# Patient Record
Sex: MALE | Race: WHITE
[De-identification: names, ages, dates, MRNs, and addresses within clinical notes are randomized per-mention and may not be internally consistent; named-entity substitution may affect disease eponyms.]

---

## 2018-12-31 ENCOUNTER — HOSPITAL ENCOUNTER (INPATIENT)
Dept: HOSPITAL 53 - M ED | Age: 43
LOS: 4 days | Discharge: HOME | DRG: 751 | End: 2019-01-04
Attending: PSYCHIATRY & NEUROLOGY | Admitting: PSYCHIATRY & NEUROLOGY
Payer: COMMERCIAL

## 2018-12-31 VITALS — HEIGHT: 69 IN | WEIGHT: 181.44 LBS | BODY MASS INDEX: 26.87 KG/M2

## 2018-12-31 VITALS — DIASTOLIC BLOOD PRESSURE: 98 MMHG | SYSTOLIC BLOOD PRESSURE: 12 MMHG

## 2018-12-31 DIAGNOSIS — R45.851: ICD-10-CM

## 2018-12-31 DIAGNOSIS — F33.2: Primary | ICD-10-CM

## 2018-12-31 LAB
ALBUMIN SERPL BCG-MCNC: 4.2 GM/DL (ref 3.2–5.2)
ALT SERPL W P-5'-P-CCNC: 35 U/L (ref 12–78)
AMPHETAMINES UR QL SCN: NEGATIVE
APAP SERPL-MCNC: < 2 UG/ML (ref 10–30)
BARBITURATES UR QL SCN: NEGATIVE
BENZODIAZ UR QL SCN: NEGATIVE
BILIRUB CONJ SERPL-MCNC: 0.1 MG/DL (ref 0–0.2)
BILIRUB SERPL-MCNC: 0.4 MG/DL (ref 0.2–1)
BUN SERPL-MCNC: 10 MG/DL (ref 7–18)
BZE UR QL SCN: NEGATIVE
CALCIUM SERPL-MCNC: 8.8 MG/DL (ref 8.5–10.1)
CANNABINOIDS UR QL SCN: NEGATIVE
CHLORIDE SERPL-SCNC: 106 MEQ/L (ref 98–107)
CO2 SERPL-SCNC: 27 MEQ/L (ref 21–32)
CREAT SERPL-MCNC: 0.92 MG/DL (ref 0.7–1.3)
ETHANOL SERPL-MCNC: < 0.003 % (ref 0–0.01)
GFR SERPL CREATININE-BSD FRML MDRD: > 60 ML/MIN/{1.73_M2} (ref 60–?)
GLUCOSE SERPL-MCNC: 104 MG/DL (ref 70–100)
HCT VFR BLD AUTO: 45.2 % (ref 42–52)
HGB BLD-MCNC: 16.1 G/DL (ref 13.5–17.5)
MCH RBC QN AUTO: 30.4 PG (ref 27–33)
MCHC RBC AUTO-ENTMCNC: 35.6 G/DL (ref 32–36.5)
MCV RBC AUTO: 85.3 FL (ref 80–96)
METHADONE UR QL SCN: NEGATIVE
OPIATES UR QL SCN: NEGATIVE
PCP UR QL SCN: NEGATIVE
PLATELET # BLD AUTO: 231 10^3/UL (ref 150–450)
POTASSIUM SERPL-SCNC: 4 MEQ/L (ref 3.5–5.1)
PROT SERPL-MCNC: 7.7 GM/DL (ref 6.4–8.2)
RBC # BLD AUTO: 5.3 10^6/UL (ref 4.3–6.1)
SALICYLATES SERPL-MCNC: < 1.7 MG/DL (ref 5–30)
SODIUM SERPL-SCNC: 141 MEQ/L (ref 136–145)
TSH SERPL DL<=0.005 MIU/L-ACNC: 1.65 UIU/ML (ref 0.36–3.74)
WBC # BLD AUTO: 6.4 10^3/UL (ref 4–10)

## 2019-01-01 VITALS — SYSTOLIC BLOOD PRESSURE: 126 MMHG | DIASTOLIC BLOOD PRESSURE: 91 MMHG

## 2019-01-01 VITALS — DIASTOLIC BLOOD PRESSURE: 78 MMHG | SYSTOLIC BLOOD PRESSURE: 119 MMHG

## 2019-01-01 NOTE — MHHPE
DATE OF ADMISSION:  12/31/2018

 

IDENTIFYING DATA: He is a 43-year-old   male, single, living by

himself in his apartment, working in a gas station. He is self-referred to

[facility] (ER) for severe depression and suicidal thoughts.

 

CHIEF COMPLAINT: "I was severely depressed, and I was having a lot of stress."

 

HISTORY OF PRESENT ILLNESS: This is a first psychiatric hospitalization. Patient

reported moved from [place] to [place], from where he originally is from,

with his daughter, who is 14 years old. He was stressed out because he had to

take care of his daughter, who has spina bifida and mental retardation. He was

working in a gas station. He was cited with medical neglect by child protective

services (CPS) for daughter having bed sores. He voluntarily gave the custody to

his late wife' parents in [place]. For the last 6 months he has been depressed, but

for the last 2 weeks it has worsened, and he had suicidal thoughts. He organized

a plan after researching and bought ibuprofen bottle and wanted to consume all of

it and then later on end with sleeping pills; however, 3 days ago he threw the

bottle and went for help to [facility], where he was

advised to go to the hospital for help. He has been complaining of poor appetite,

poor sleep, poor self-esteem, low energy, and suicidal thoughts. Denies any

history of manic episodes or psychosis.

 

PAST PSYCHIATRIC HISTORY: Never been on any psychotropic medication. Never seen

any psychiatrist before. Never been hospitalized.

 

DRUG/ALCOHOL HISTORY: Patient denies drug or alcohol use.

 

SUICIDAL HISTORY: Denies suicidal attempts.

 

MEDICAL HISTORY: Patient denies any medical problems.

 

FAMILY HISTORY: Mother has history of depression. Father was alcoholic, and he

committed suicide.

 

PERSONAL HISTORY: He was born and raised in [place]. He got . Moved to

[place]. Lived with his wife for 10 years. For the last 6 years he has been

. He has a 14-year-old daughter. He has college degree in [employer].

He has worked from age 17. No history of physical or sexual abuse.

 

MENTAL STATUS EXAMINATION: He is casually dressed with clean clothes,

cooperative. Made good eye contact. Psychomotor activity is normal. Speech rate,

rhythm, volume are good, articulate. Mood is depressed. Affect is blunted.

Thought process linear, goal directed, coherent. Thought content: Denied any

delusions, however, continues to have some vague suicidal ideas without any

plans. His insight and judgment are fair to poor. He is alert and oriented to

time, place, and person. Memory is intact.

 

VITAL SIGNS: Temperature 98.4, pulse 75, respiratory rate is 14, blood pressure

is 119/78.

 

REVIEW OF SYSTEMS:

CONSTITUTIONAL: Denied any night sweats, fever, weight loss.

HEENT: Denied any sore throats, hearing loss, or epistaxis.

RESPIRATORY: Denied cough or chest pain.

CARDIOVASCULAR: Denied palpitations, chest pain.

ABDOMEN: Denied abdominal pain, dysuria.

GENITOURINARY:  Denied dysuria, hematuria.

CENTRAL NERVOUS SYSTEM: Denied numbness, tingling, and dizziness. Gait is normal.

 

 

LABORATORY DATA:

CBC, CMP within normal limits. Toxicology was negative.

 

DIAGNOSES:

Major depressive disorder, severe, without psychosis.

 

ASSESSMENT AND PLAN: He is a 43-year-old male who has been severely depressed

with a lot of stress. Recently he had to give the custody of his daughter to his

late wife's parents. He had an organized plan to kill himself, so he is in high

risk of suicide.

 

1. Admit to [facility].

2. Keep on 15-minute checks and suicide watch.

3. Continue individual, group, and milieu therapy. Encourage him to attend

activities.

4. He will be seen by PA for medical needs.

5. He will be seen by case management social work.

 

MEDICATIONS:

- Prozac 10 mg once daily. Titrate the dose.

- Patient is on trazodone 50 mg at bedtime as needed for insomnia.

 

ESTIMATED LENGTH OF STAY: 5-6 days.

## 2019-01-02 VITALS — DIASTOLIC BLOOD PRESSURE: 82 MMHG | SYSTOLIC BLOOD PRESSURE: 132 MMHG

## 2019-01-02 VITALS — DIASTOLIC BLOOD PRESSURE: 81 MMHG | SYSTOLIC BLOOD PRESSURE: 139 MMHG

## 2019-01-02 NOTE — HPE
DATE OF ADMISSION:  12/31/2018

 

HISTORY OF PRESENT ILLNESS:   Please refer to psychiatric history and evaluation

for further details on this admission.  This examination and history is intended

for medical issues, which may need treatment, followup or consultation on this

43-year-old male.

 

ALLERGIES:  No known allergies.

 

PRIMARY CARE PROVIDER:  None.

 

SOCIAL HISTORY:   He is .  EtOH none. Smokes none.  Recreational drug use

none.

 

PAST MEDICAL HISTORY: Negative.

 

PAST SURGICAL HISTORY:  Left knee surgery.

 

HOME MEDICATIONS: None.

 

LABORATORY STUDIES:  Complete blood count (CBC) was normal.  Electrolytes were

normal.  BUN and creatinine were 10 and 0.92.  Urine toxicology is negative.

 

REVIEW OF SYSTEMS:  Ten systems review was done and unremarkable.

 

PHYSICAL EXAMINATION:

GENERAL:  43-year-old cooperative male in no acute distress.  Height 69 inches,

weight 82.3 kg, Body Mass Index (BMI) 26.8.

VITAL SIGNS:  127/84.  Pulse 88, respirations 18, temperature 98.6, oxygen

saturation 98% on room air.  The patient is alert and oriented times three.

HEENT:  Pupils are equal and reactive to light.  Extraocular muscles intact.

Sclerae clear.  Conjunctivae normal.  No facial asymmetry.  Pharynx, gums and

tongue pink and moist.  Tongue is midline.

NECK:  Supple without lymphadenopathy, thyromegaly or goiter. Carotids are 2+

without bruit.

CHEST:  Clear to auscultation without wheeze or retraction.

HEART:  Regular.

ABDOMEN:  Benign.  Bowel sounds positive.

GENITOURINARY/RECTAL:  Not done.

EXTREMITIES:  Equal strength, full range of motion.  No clubbing, cyanosis, and

edema.  Peripheral pulses equal and palpable bilaterally.

SKIN:  Warm and dry.

 

IMPRESSION/PLAN:

1.  Psychiatric plan per psychiatry.

2.  No acute medical issues.

## 2019-01-02 NOTE — MHIPNPDOC
[facility] Progress Note


Progress Note


DATE OF SERVICE: 1/2/19





HISTORY: This is a first psychiatric hospitalization. Patient


reported moved from [place] to [place], from where he originally is from,


with his daughter, who is 14 years old. He was stressed out because he had to


take care of his daughter, who has spina bifida and mental retardation. He was


working in a gas station. He was cited with medical neglect by child protective


services (CPS) for daughter having bed sores. He voluntarily gave the custody to


his late wife' parents in [place]. For the last 6 months he has been depressed, 

but


for the last 2 weeks it has worsened, and he had suicidal thoughts. He organized


a plan after researching and bought ibuprofen bottle and wanted to consume all 

of


it and then later on end with sleeping pills; however, 3 days ago he threw the


bottle and went for help to [facility], where he was


advised to go to the hospital for help. He has been complaining of poor 

appetite,


poor sleep, poor self-esteem, low energy, and suicidal thoughts. Denies any


history of manic episodes or psychosis.





VITAL SIGNS: See below.





NEW TEST RESULTS: See below.





CURRENT MEDICATIONS: See below.





MENTAL STATUS EXAMINATION:


He is casually dressed with clean clothes,


cooperative. Made good eye contact. Psychomotor activity is normal. Speech rate,


rhythm, volume are good, articulate. Mood is depressed. Affect is monothymic, 

improved range.


Thought process linear, goal directed, coherent. Cognitive distortions.  Thought

content: Denied any


delusions.  Denies SI/HI. His insight and judgment are fair to poor. He is alert

and oriented to


time, place, and person. Memory is intact.





DIAGNOSES:


1. Major depressive disorder, severe, without psychosis.


 


ASSESSMENT:Pt seen and states that his mood is better and he's finding prozac 

beneficial and tolerating well.  States his anxiety is improved too.  States he 

feels anxious at times as he thinks very critically of himself occasionally.  

Discussed him replacing negative thought with positive thought (CBT) and states 

he will try to.  States he slept well last night.  He is attending groups and 

finding them helpful.  He denies insomnia, SI/HI, hallucinations, delusions.  Pt

feels safe here





MANAGEMENT PLAN: Continue current plan.


Medications:


- Prozac 10 mg daily


- trazodone 50 mg at bedtime as needed for insomnia.





Time Spent: 30ming





Vital Signs





Vital Signs








  Date Time  Temp Pulse Resp B/P (MAP) Pulse Ox O2 Delivery O2 Flow Rate FiO2


 


1/2/19 07:00 97.8 74 16 132/82 (99)    


 


12/31/18 17:21      Room Air  


 


12/31/18 17:03     98   











Current Medications





Current Medications


Acetaminophen (Tylenol Tab) 650 mg Q6HP  PRN PO HEADACHE or DISCOMFORT;  Start 

12/31/18 at 14:00


Al Hydrox/Mg Hydrox/Simethicone (Mylanta) 30 ml Q4HP  PRN PO 

HEARTBURN/INDIGESTION;  Start 12/31/18 at 14:00


Fluoxetine HCl (PROzac) 10 mg DAILY PO  Last administered on 1/2/19at 08:26;  

Start 1/1/19 at 09:00


Home Med (Med Rec Complete!)  ASDIRECTED XX ;  Start 12/31/18 at 12:30;  Stop 

12/31/18 at 12:30;  Status DC


Magnesium Hydroxide (Milk Of Magnesia) 30 ml DAILYPRN  PRN PO CONSTIPATION;  

Start 12/31/18 at 14:00


Trazodone HCl (Desyrel) 50 mg QHSP  PRN PO INSOMNIA Last administered on 

1/1/19at 21:04;  Start 12/31/18 at 14:00





Allergies


Coded Allergies:  


     No Known Allergies (Unverified , 12/31/18)











REYES PIERCE DO          Jan 2, 2019 12:11 pm

## 2019-01-03 VITALS — DIASTOLIC BLOOD PRESSURE: 82 MMHG | SYSTOLIC BLOOD PRESSURE: 138 MMHG

## 2019-01-03 VITALS — DIASTOLIC BLOOD PRESSURE: 71 MMHG | SYSTOLIC BLOOD PRESSURE: 132 MMHG

## 2019-01-03 NOTE — MHIPNPDOC
[facility] Progress Note


Progress Note


DATE OF SERVICE: 1/3/19





HISTORY: This is a first psychiatric hospitalization. Patient


reported moved from [place] to [place], from where he originally is from,


with his daughter, who is 14 years old. He was stressed out because he had to


take care of his daughter, who has spina bifida and mental retardation. He was


working in a gas station. He was cited with medical neglect by child protective


services (CPS) for daughter having bed sores. He voluntarily gave the custody to


his late wife' parents in [place]. For the last 6 months he has been depressed, 

but


for the last 2 weeks it has worsened, and he had suicidal thoughts. He organized


a plan after researching and bought ibuprofen bottle and wanted to consume all 

of


it and then later on end with sleeping pills; however, 3 days ago he threw the


bottle and went for help to [facility], where he was


advised to go to the hospital for help. He has been complaining of poor 

appetite,


poor sleep, poor self-esteem, low energy, and suicidal thoughts. Denies any


history of manic episodes or psychosis.





VITAL SIGNS: See below.





NEW TEST RESULTS: See below.





CURRENT MEDICATIONS: See below.





MENTAL STATUS EXAMINATION:


He is casually dressed with clean clothes,


cooperative. Made good eye contact. Psychomotor activity is normal. Speech rate,


rhythm, volume are good, articulate. Mood is less depressed but anxious. Affect 

is congruent.


Thought process linear, goal directed, coherent. Cognitive distortions.  Thought

content: Denied any


delusions.  Denies SI/HI. His insight and judgment are fair to poor. He is alert

and oriented to


time, place, and person. Memory is intact.  Insight and judgement are fair.





DIAGNOSES:


1. Major depressive disorder, severe, without psychosis.


 


ASSESSMENT:Pt seen and states that his yesterday was challenging b/c he made a 

friend on the unit that went home yesterday.  States it caused him think more of

himself and his own problems which is a good thing for him.  States his anxiety 

is improving and a little less than yesterday.  Continues to work on not 

thinking so critically of himself.  Endorses anxiety due to continues thoughts 

at night that make him anxious.  He is attending groups and finding them 

helpful.  He denies insomnia, SI/HI, hallucinations, delusions.  Pt feels safe 

here





MANAGEMENT PLAN: Continue current plan.  Start seroquel 25mg qhs for 

mood/anxiety.


Medications:


- Prozac 10 mg daily


- seroquel 25mg qhs


- trazodone 50 mg at bedtime as needed for insomnia.





Time Spent: 30ming





Vital Signs





Vital Signs








  Date Time  Temp Pulse Resp B/P (MAP) Pulse Ox O2 Delivery O2 Flow Rate FiO2


 


1/3/19 06:27 99.7 85 18 132/71 (91)  Room Air  


 


1/2/19 18:00     98   











Current Medications





Current Medications


Acetaminophen (Tylenol Tab) 650 mg Q6HP  PRN PO HEADACHE or DISCOMFORT;  Start 

12/31/18 at 14:00


Al Hydrox/Mg Hydrox/Simethicone (Mylanta) 30 ml Q4HP  PRN PO 

HEARTBURN/INDIGESTION;  Start 12/31/18 at 14:00


Fluoxetine HCl (PROzac) 10 mg DAILY PO  Last administered on 1/3/19at 08:20;  

Start 1/1/19 at 09:00


Home Med (Med Rec Complete!)  ASDIRECTED XX ;  Start 12/31/18 at 12:30;  Stop 

12/31/18 at 12:30;  Status DC


Magnesium Hydroxide (Milk Of Magnesia) 30 ml DAILYPRN  PRN PO CONSTIPATION;  

Start 12/31/18 at 14:00


Trazodone HCl (Desyrel) 50 mg QHSP  PRN PO INSOMNIA Last administered on 

1/1/19at 21:04;  Start 12/31/18 at 14:00





Allergies


Coded Allergies:  


     No Known Allergies (Unverified , 12/31/18)











REYES PIERCE DO            Raffy 3, 2019 09:45

## 2019-01-04 VITALS — DIASTOLIC BLOOD PRESSURE: 66 MMHG | SYSTOLIC BLOOD PRESSURE: 115 MMHG

## 2019-01-04 NOTE — MHDSPDOC
[facility] Discharge Summary


Discharge Summary


DATE OF ADMISSION: Dec 31, 2018 at 13:57 


DATE OF DISCHARGE: Jan 4, 2019





DISCHARGE DIAGNOSES:


1. Major depressive disorder, severe, without psychosis.





REASON FOR ADMISSION: This is a first psychiatric hospitalization. Patient


reported moved from [place] to [place], from where he originally is from,


with his daughter, who is 14 years old. He was stressed out because he had to


take care of his daughter, who has spina bifida and mental retardation. He was


working in a gas station. He was cited with medical neglect by child protective


services (CPS) for daughter having bed sores. He voluntarily gave the custody to


his late wife' parents in [place]. For the last 6 months he has been depressed, 

but


for the last 2 weeks it has worsened, and he had suicidal thoughts. He organized


a plan after researching and bought ibuprofen bottle and wanted to consume all 

of


it and then later on end with sleeping pills; however, 3 days ago he threw the


bottle and went for help to [facility], where he was


advised to go to the hospital for help. He has been complaining of poor 

appetite,


poor sleep, poor self-esteem, low energy, and suicidal thoughts. Denies any


history of manic episodes or psychosis.





CONSULTANTS INVOLVED: none





TREATMENT AND PROGRESS ON THE UNIT : Pt was admitted to [facility], seen for 

psychiatric assessment and started on prozac 10mg daily for depression.  He was 

provided trazodone 50mg qhs prn insomnia.  He tolerated his medications and 

found them beneficial.  He attended groups daily during his stay.  His symptoms 

improved with treatment.  On day of discharge he denied depression, anxiety, 

insomnia, SI/HI, hallucinations, delusions.  He was discharged home with follow-

up at [facility].  He felt safe for discharge.





DISCHARGE ASSESSMENT: Pt seen and states that he feels good today and ready to 

go home.  States he's tolerating his medication and finding it beneficial.  

Denies depression and anxiety greatly improved.  Sleeping well at night with the

use of trazodone.  He has been attending groups and finding them beneficial, 

learning coping skills. Denies depression, anxiety, insomnia, SI/HI, 

hallucinations, delusions.  Feels safe to be discharged home.





MENTAL STATUS EXAMINATION ON DISCHARGE: 


He is casually dressed with clean clothes,


cooperative. Made good eye contact. Psychomotor activity is normal. Speech rate,


rhythm, volume are good, articulate. Mood is "good"  Denies anxiety. Affect is 

congruent, euthymic.


Thought process linear, goal directed, coherent. Thought content: Denied any


delusions.  Denies SI/HI. His insight and judgment are good. He is alert and 

oriented to


time, place, and person. Memory is intact.  





MEDICATIONS ON DISCHARGE:


- Prozac 10 mg daily


- trazodone 50 mg at bedtime as needed for insomnia.





PLAN/FOLLOWUP ARRANGEMENTS: D/c home with follow-up at [facility].





The amount of time spent in the coordination of care for this patient was 

approximately 30 minutes.





Vital Signs/I&Os





Vital Signs








  Date Time  Temp Pulse Resp B/P (MAP) Pulse Ox O2 Delivery O2 Flow Rate FiO2


 


1/4/19 06:21 98.3 80 16 115/66 (82)  Room Air  


 


1/2/19 18:00     98   











Medications


Scheduled


Fluoxetine HCl (Prozac) 10 Mg Cap, 10 MG PO DAILY for mood, #10





Scheduled PRN


Trazodone HCl (Trazodone HCl) 50 Mg Tab, 50 MG PO QHSP PRN for INSOMNIA, #10





Allergies


Coded Allergies:  


     No Known Allergies (Unverified , 12/31/18)











REYES PIERCE DO            Jan 4, 2019 10:34

## 2019-01-31 ENCOUNTER — HOSPITAL ENCOUNTER (OUTPATIENT)
Dept: HOSPITAL 53 - M SFHCPLAZ | Age: 44
End: 2019-01-31
Attending: PHYSICIAN ASSISTANT
Payer: COMMERCIAL

## 2019-01-31 DIAGNOSIS — Z13.220: ICD-10-CM

## 2019-01-31 DIAGNOSIS — F32.9: Primary | ICD-10-CM

## 2019-01-31 LAB
ALBUMIN SERPL BCG-MCNC: 3.9 GM/DL (ref 3.2–5.2)
ALT SERPL W P-5'-P-CCNC: 25 U/L (ref 12–78)
BILIRUB SERPL-MCNC: 0.5 MG/DL (ref 0.2–1)
BUN SERPL-MCNC: 14 MG/DL (ref 7–18)
CALCIUM SERPL-MCNC: 8.7 MG/DL (ref 8.5–10.1)
CHLORIDE SERPL-SCNC: 103 MEQ/L (ref 98–107)
CHOLEST SERPL-MCNC: 122 MG/DL (ref ?–200)
CHOLEST/HDLC SERPL: 4.07 {RATIO} (ref ?–5)
CO2 SERPL-SCNC: 28 MEQ/L (ref 21–32)
CREAT SERPL-MCNC: 0.89 MG/DL (ref 0.7–1.3)
GFR SERPL CREATININE-BSD FRML MDRD: > 60 ML/MIN/{1.73_M2} (ref 60–?)
GLUCOSE SERPL-MCNC: 107 MG/DL (ref 70–100)
HCT VFR BLD AUTO: 43.5 % (ref 42–52)
HDLC SERPL-MCNC: 30 MG/DL (ref 40–?)
HGB BLD-MCNC: 15.4 G/DL (ref 13.5–17.5)
LDLC SERPL CALC-MCNC: 66 MG/DL (ref ?–100)
MCH RBC QN AUTO: 30.4 PG (ref 27–33)
MCHC RBC AUTO-ENTMCNC: 35.4 G/DL (ref 32–36.5)
MCV RBC AUTO: 86 FL (ref 80–96)
NONHDLC SERPL-MCNC: 92 MG/DL
PLATELET # BLD AUTO: 236 10^3/UL (ref 150–450)
POTASSIUM SERPL-SCNC: 3.7 MEQ/L (ref 3.5–5.1)
PROT SERPL-MCNC: 7.1 GM/DL (ref 6.4–8.2)
RBC # BLD AUTO: 5.06 10^6/UL (ref 4.3–6.1)
SODIUM SERPL-SCNC: 140 MEQ/L (ref 136–145)
T4 FREE SERPL-MCNC: 0.98 NG/DL (ref 0.76–1.46)
TRIGL SERPL-MCNC: 128 MG/DL (ref ?–150)
TSH SERPL DL<=0.005 MIU/L-ACNC: 1.86 UIU/ML (ref 0.36–3.74)
WBC # BLD AUTO: 8 10^3/UL (ref 4–10)

## 2019-04-29 ENCOUNTER — HOSPITAL ENCOUNTER (EMERGENCY)
Dept: HOSPITAL 53 - M ED | Age: 44
Discharge: HOME | End: 2019-04-29
Payer: COMMERCIAL

## 2019-04-29 VITALS — SYSTOLIC BLOOD PRESSURE: 120 MMHG | DIASTOLIC BLOOD PRESSURE: 77 MMHG

## 2019-04-29 VITALS — WEIGHT: 180.12 LBS | BODY MASS INDEX: 27.3 KG/M2 | HEIGHT: 68 IN

## 2019-04-29 DIAGNOSIS — H10.9: Primary | ICD-10-CM
